# Patient Record
Sex: FEMALE | Race: WHITE | NOT HISPANIC OR LATINO | Employment: FULL TIME | URBAN - METROPOLITAN AREA
[De-identification: names, ages, dates, MRNs, and addresses within clinical notes are randomized per-mention and may not be internally consistent; named-entity substitution may affect disease eponyms.]

---

## 2018-10-20 ENCOUNTER — HOSPITAL ENCOUNTER (EMERGENCY)
Facility: MEDICAL CENTER | Age: 28
End: 2018-10-20
Attending: EMERGENCY MEDICINE
Payer: COMMERCIAL

## 2018-10-20 VITALS
WEIGHT: 175.04 LBS | HEIGHT: 72 IN | HEART RATE: 88 BPM | TEMPERATURE: 97.9 F | DIASTOLIC BLOOD PRESSURE: 90 MMHG | OXYGEN SATURATION: 99 % | BODY MASS INDEX: 23.71 KG/M2 | RESPIRATION RATE: 16 BRPM | SYSTOLIC BLOOD PRESSURE: 140 MMHG

## 2018-10-20 DIAGNOSIS — A09 ACUTE INFECTIOUS DIARRHEA: ICD-10-CM

## 2018-10-20 LAB
ALBUMIN SERPL BCP-MCNC: 3.8 G/DL (ref 3.2–4.9)
ALBUMIN/GLOB SERPL: 1.1 G/DL
ALP SERPL-CCNC: 69 U/L (ref 30–99)
ALT SERPL-CCNC: 17 U/L (ref 2–50)
ANION GAP SERPL CALC-SCNC: 7 MMOL/L (ref 0–11.9)
AST SERPL-CCNC: 16 U/L (ref 12–45)
BASOPHILS # BLD AUTO: 0.3 % (ref 0–1.8)
BASOPHILS # BLD: 0.03 K/UL (ref 0–0.12)
BILIRUB SERPL-MCNC: 0.5 MG/DL (ref 0.1–1.5)
BUN SERPL-MCNC: 12 MG/DL (ref 8–22)
C DIFF DNA SPEC QL NAA+PROBE: NORMAL
C DIFF TOX A+B STL QL IA: POSITIVE
C DIFF TOX GENS STL QL NAA+PROBE: NORMAL
CALCIUM SERPL-MCNC: 9 MG/DL (ref 8.4–10.2)
CHLORIDE SERPL-SCNC: 107 MMOL/L (ref 96–112)
CO2 SERPL-SCNC: 24 MMOL/L (ref 20–33)
CREAT SERPL-MCNC: 0.77 MG/DL (ref 0.5–1.4)
EOSINOPHIL # BLD AUTO: 0.24 K/UL (ref 0–0.51)
EOSINOPHIL NFR BLD: 2.7 % (ref 0–6.9)
ERYTHROCYTE [DISTWIDTH] IN BLOOD BY AUTOMATED COUNT: 48 FL (ref 35.9–50)
GLOBULIN SER CALC-MCNC: 3.5 G/DL (ref 1.9–3.5)
GLUCOSE SERPL-MCNC: 90 MG/DL (ref 65–99)
HCG SERPL QL: NEGATIVE
HCT VFR BLD AUTO: 38.3 % (ref 37–47)
HGB BLD-MCNC: 12.1 G/DL (ref 12–16)
IMM GRANULOCYTES # BLD AUTO: 0.03 K/UL (ref 0–0.11)
IMM GRANULOCYTES NFR BLD AUTO: 0.3 % (ref 0–0.9)
LIPASE SERPL-CCNC: 33 U/L (ref 7–58)
LYMPHOCYTES # BLD AUTO: 1.64 K/UL (ref 1–4.8)
LYMPHOCYTES NFR BLD: 18.3 % (ref 22–41)
MCH RBC QN AUTO: 26.9 PG (ref 27–33)
MCHC RBC AUTO-ENTMCNC: 31.6 G/DL (ref 33.6–35)
MCV RBC AUTO: 85.3 FL (ref 81.4–97.8)
MONOCYTES # BLD AUTO: 0.53 K/UL (ref 0–0.85)
MONOCYTES NFR BLD AUTO: 5.9 % (ref 0–13.4)
NEUTROPHILS # BLD AUTO: 6.47 K/UL (ref 2–7.15)
NEUTROPHILS NFR BLD: 72.5 % (ref 44–72)
NRBC # BLD AUTO: 0 K/UL
NRBC BLD-RTO: 0 /100 WBC
PLATELET # BLD AUTO: 334 K/UL (ref 164–446)
PMV BLD AUTO: 9.5 FL (ref 9–12.9)
POTASSIUM SERPL-SCNC: 3.4 MMOL/L (ref 3.6–5.5)
PROT SERPL-MCNC: 7.3 G/DL (ref 6–8.2)
RBC # BLD AUTO: 4.49 M/UL (ref 4.2–5.4)
SODIUM SERPL-SCNC: 138 MMOL/L (ref 135–145)
WBC # BLD AUTO: 8.9 K/UL (ref 4.8–10.8)
WBC STL QL MICRO: ABNORMAL

## 2018-10-20 PROCEDURE — 36415 COLL VENOUS BLD VENIPUNCTURE: CPT

## 2018-10-20 PROCEDURE — 99284 EMERGENCY DEPT VISIT MOD MDM: CPT

## 2018-10-20 PROCEDURE — 85025 COMPLETE CBC W/AUTO DIFF WBC: CPT

## 2018-10-20 PROCEDURE — 87493 C DIFF AMPLIFIED PROBE: CPT

## 2018-10-20 PROCEDURE — 87324 CLOSTRIDIUM AG IA: CPT

## 2018-10-20 PROCEDURE — 89055 LEUKOCYTE ASSESSMENT FECAL: CPT

## 2018-10-20 PROCEDURE — 700102 HCHG RX REV CODE 250 W/ 637 OVERRIDE(OP): Performed by: EMERGENCY MEDICINE

## 2018-10-20 PROCEDURE — A9270 NON-COVERED ITEM OR SERVICE: HCPCS | Performed by: EMERGENCY MEDICINE

## 2018-10-20 PROCEDURE — 83690 ASSAY OF LIPASE: CPT

## 2018-10-20 PROCEDURE — 84703 CHORIONIC GONADOTROPIN ASSAY: CPT

## 2018-10-20 PROCEDURE — 80053 COMPREHEN METABOLIC PANEL: CPT

## 2018-10-20 PROCEDURE — 700105 HCHG RX REV CODE 258: Performed by: EMERGENCY MEDICINE

## 2018-10-20 RX ORDER — CETIRIZINE HYDROCHLORIDE 10 MG/1
10 TABLET ORAL DAILY
COMMUNITY

## 2018-10-20 RX ORDER — OMEPRAZOLE 20 MG/1
20 CAPSULE, DELAYED RELEASE ORAL DAILY
COMMUNITY

## 2018-10-20 RX ORDER — GINSENG 100 MG
1 CAPSULE ORAL 2 TIMES DAILY
Qty: 60 CAP | Refills: 1 | Status: SHIPPED | OUTPATIENT
Start: 2018-10-20 | End: 2018-11-19

## 2018-10-20 RX ORDER — CLINDAMYCIN HYDROCHLORIDE 150 MG/1
150 CAPSULE ORAL 3 TIMES DAILY
Status: SHIPPED | COMMUNITY
Start: 2018-09-25 | End: 2018-11-09

## 2018-10-20 RX ORDER — SODIUM CHLORIDE 9 MG/ML
1000 INJECTION, SOLUTION INTRAVENOUS CONTINUOUS
Status: ACTIVE | OUTPATIENT
Start: 2018-10-20 | End: 2018-10-20

## 2018-10-20 RX ORDER — NITROFURANTOIN 25; 75 MG/1; MG/1
100 CAPSULE ORAL 2 TIMES DAILY
Status: SHIPPED | COMMUNITY
Start: 2018-10-04 | End: 2018-11-09

## 2018-10-20 RX ORDER — METRONIDAZOLE 500 MG/1
500 TABLET ORAL ONCE
Status: COMPLETED | OUTPATIENT
Start: 2018-10-20 | End: 2018-10-20

## 2018-10-20 RX ORDER — METRONIDAZOLE 500 MG/1
500 TABLET ORAL 3 TIMES DAILY
Qty: 30 TAB | Refills: 0 | Status: SHIPPED | OUTPATIENT
Start: 2018-10-20 | End: 2018-10-30

## 2018-10-20 RX ADMIN — SODIUM CHLORIDE 1000 ML: 9 INJECTION, SOLUTION INTRAVENOUS at 10:13

## 2018-10-20 RX ADMIN — METRONIDAZOLE 500 MG: 500 TABLET ORAL at 11:02

## 2018-10-20 ASSESSMENT — PAIN SCALES - GENERAL: PAINLEVEL_OUTOF10: 4

## 2018-10-20 NOTE — ED PROVIDER NOTES
ED Provider Note    CHIEF COMPLAINT  Chief Complaint   Patient presents with   • Diarrhea     x2 weeks. Foul smelling, ,ultiple episodes daily.  2 recent courses of ABX. 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     • Abdominal Cramping       HPI  Allison Cristobal is a 28 y.o. female who presents complaining of 2 weeks of's foul-smelling diarrhea with some mucus that she has up to 5 times a day.  The patient states that the end of September she had breast augmentation surgery and was on antibiotics for a week.  After this she developed a urinary tract infection and was on antibiotics for that.  She states that she was on clindamycin and Bactrim as her antibiotics.  She states that 2 weeks ago she started having mucousy diarrhea.  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  She is concerned that she might have C. difficile.  The patient denies any vomiting.  She has abdominal cramping.  She denies any dysuria frequency or urgency.  She has no fevers or chills.  She is otherwise healthy without medical problems.  She denies any smoking drinking or drug use.    REVIEW OF SYSTEMS    HEENT:  No ear pain, congestion or sore throat   EYES: no discharge redness or vision changes  CARDIAC: no chest pain, palpitations    PULMONARY: no dyspnea, cough or congestion   GI: no vomiting positive diarrhea positive abdominal pain   : no dysuria, back pain or hematuria   Neuro: no weakness, numbness aphasia or headache  Musculoskeletal: no swelling deformity or pain no joint swelling  Endocrine: no fevers, sweating, weight loss   SKIN: no rash, erythema or contusions     See history of present illness all other systems are negative      PAST MEDICAL HISTORY  No past medical history on file.    FAMILY HISTORY  No family history on file.    SOCIAL HISTORY  Social History     Social History   • Marital status: Single     Spouse name: N/A   • Number of children: N/A   • Years of education: N/A     Social History  "Main Topics   • Smoking status: Not on file   • Smokeless tobacco: Not on file   • Alcohol use Not on file   • Drug use: Unknown   • Sexual activity: Not on file     Other Topics Concern   • Not on file     Social History Narrative   • No narrative on file       SURGICAL HISTORY  Past Surgical History:   Procedure Laterality Date   • OTHER      Breast augmentation       CURRENT MEDICATIONS  Home Medications     Reviewed by Uriel Vital (Pharmacy Tech) on 10/20/18 at 1051  Med List Status: Complete   Medication Last Dose Status   cetirizine (ZYRTEC) 10 MG Tab 10/20/2018 Active   clindamycin (CLEOCIN) 150 MG Cap 9/30/2018 Active   nitrofurantoin monohydr macro (MACROBID) 100 MG Cap 10/10/2018 Active   norethindrone-ethinyl estradiol (CYCLAFEM 1/35) 1-35 MG-MCG per tablet 10/20/2018 Active   omeprazole (PRILOSEC) 20 MG delayed-release capsule 10/19/2018 Active                 ALLERGIES  Allergies   Allergen Reactions   • Cefaclor Rash and Swelling     Rxn - ongoing     • Nsaids Swelling     Rxn - ongoing   • Pcn [Penicillins] Rash and Swelling     Rxn - ongoing     • Sulfamethoxazole W-Trimethoprim Rash and Swelling     Rxn - ongoing         PHYSICAL EXAM  VITAL SIGNS: /103   Pulse (!) 110   Temp 36.6 °C (97.9 °F)   Resp 16   Ht 2.007 m (6' 7\")   Wt 79.4 kg (175 lb 0.7 oz)   LMP 10/08/2018 (Approximate)   SpO2 98%   BMI 19.72 kg/m²  Room air O2: 98    Constitutional: Well developed, Well nourished, No acute distress, Non-toxic appearance.   HENT: Normocephalic, Atraumatic, Bilateral external ears normal, Oropharynx moist, No oral exudates, Nose normal.   Eyes: PERRLA, EOMI, Conjunctiva normal, No discharge.   Neck: Normal range of motion, No tenderness, Supple, No stridor.   Lymphatic: No lymphadenopathy noted.   Cardiovascular: Normal heart rate, Normal rhythm, No murmurs, No rubs, No gallops.   Thorax & Lungs: Normal breath sounds, No respiratory distress, No wheezing, No chest tenderness. "   Abdomen: Increased bowel sounds no distention no rebound masses or peritoneal signs abdomen is soft to palpation  Skin: Warm, Dry, No erythema, No rash.   Back: No tenderness, No CVA tenderness.   Extremities: Intact distal pulses, No edema, No tenderness, No cyanosis, No clubbing.   Neurologic: Alert & oriented x 3, Normal motor function, Normal sensory function, No focal deficits noted.           COURSE & MEDICAL DECISION MAKING  Pertinent Labs & Imaging studies reviewed. (See chart for details)  Differential diagnosis: C. difficile, gastroenteritis, colitis, dehydration    Results for orders placed or performed during the hospital encounter of 10/20/18   STOOL WBC'S   Result Value Ref Range    Stool WBC's Many (A) None seen   CBC WITH DIFFERENTIAL   Result Value Ref Range    WBC 8.9 4.8 - 10.8 K/uL    RBC 4.49 4.20 - 5.40 M/uL    Hemoglobin 12.1 12.0 - 16.0 g/dL    Hematocrit 38.3 37.0 - 47.0 %    MCV 85.3 81.4 - 97.8 fL    MCH 26.9 (L) 27.0 - 33.0 pg    MCHC 31.6 (L) 33.6 - 35.0 g/dL    RDW 48.0 35.9 - 50.0 fL    Platelet Count 334 164 - 446 K/uL    MPV 9.5 9.0 - 12.9 fL    Neutrophils-Polys 72.50 (H) 44.00 - 72.00 %    Lymphocytes 18.30 (L) 22.00 - 41.00 %    Monocytes 5.90 0.00 - 13.40 %    Eosinophils 2.70 0.00 - 6.90 %    Basophils 0.30 0.00 - 1.80 %    Immature Granulocytes 0.30 0.00 - 0.90 %    Nucleated RBC 0.00 /100 WBC    Neutrophils (Absolute) 6.47 2.00 - 7.15 K/uL    Lymphs (Absolute) 1.64 1.00 - 4.80 K/uL    Monos (Absolute) 0.53 0.00 - 0.85 K/uL    Eos (Absolute) 0.24 0.00 - 0.51 K/uL    Baso (Absolute) 0.03 0.00 - 0.12 K/uL    Immature Granulocytes (abs) 0.03 0.00 - 0.11 K/uL    NRBC (Absolute) 0.00 K/uL   COMP METABOLIC PANEL   Result Value Ref Range    Sodium 138 135 - 145 mmol/L    Potassium 3.4 (L) 3.6 - 5.5 mmol/L    Chloride 107 96 - 112 mmol/L    Co2 24 20 - 33 mmol/L    Anion Gap 7.0 0.0 - 11.9    Glucose 90 65 - 99 mg/dL    Bun 12 8 - 22 mg/dL    Creatinine 0.77 0.50 - 1.40 mg/dL     Calcium 9.0 8.4 - 10.2 mg/dL    AST(SGOT) 16 12 - 45 U/L    ALT(SGPT) 17 2 - 50 U/L    Alkaline Phosphatase 69 30 - 99 U/L    Total Bilirubin 0.5 0.1 - 1.5 mg/dL    Albumin 3.8 3.2 - 4.9 g/dL    Total Protein 7.3 6.0 - 8.2 g/dL    Globulin 3.5 1.9 - 3.5 g/dL    A-G Ratio 1.1 g/dL   LIPASE   Result Value Ref Range    Lipase 33 7 - 58 U/L   BETA-HCG QUALITATIVE SERUM   Result Value Ref Range    Beta-Hcg Qualitative Serum Negative Negative   ESTIMATED GFR   Result Value Ref Range    GFR If African American >60 >60 mL/min/1.73 m 2    GFR If Non African American >60 >60 mL/min/1.73 m 2        10:30 AM an IV was started and I gave the patient a liter of fluid because of her constant diarrhea and feeling dehydrated.HYDRATION: Based on the patient's presentation of Acute Diarrhea the patient was given IV fluids. IV Hydration was used becasue oral hydration was not adequate alone. Upon recheck following hydration, the patient was improved.     10:59 AM patient feels improved after IV fluids.  She does have a large amount of white cells in her stool.  Given her history I will treat her with Flagyl for possible C. difficile.  She is able to keep fluids down and will be discharged home with oral medication.  If she develops any abdominal distention fevers dizziness or worsening symptoms she should return.  I advised her to follow-up with a primary care physician for recheck.  The patient understands her diagnosis and treatment plan.    [address] [address]  [phone]  Call in 2 days  to establish care, for recheck    Current Outpatient Prescriptions   Medication Sig Dispense Refill   • omeprazole (PRILOSEC) 20 MG delayed-release capsule Take 20 mg by mouth every day.     • cetirizine (ZYRTEC) 10 MG Tab Take 10 mg by mouth every day.     • norethindrone-ethinyl estradiol (CYCLAFEM 1/35) 1-35 MG-MCG per tablet Take 1 Tab by mouth every day.     • nitrofurantoin monohydr macro (MACROBID)  100 MG Cap Take 100 mg by mouth 2 times a day. 6 day course started 10/4/18 - completed     • clindamycin (CLEOCIN) 150 MG Cap Take 150 mg by mouth 3 times a day. 5 day course started 9/25/18     • metroNIDAZOLE (FLAGYL) 500 MG Tab Take 1 Tab by mouth 3 times a day for 10 days. 30 Tab 0   • Probiotic Product (ACIDOPHILUS HIGH-POTENCY) Cap Take 1 Cap by mouth 2 Times a Day for 30 days. 60 Cap 1         FINAL IMPRESSION  1. Acute infectious diarrhea            Electronically signed by: Deloris Schilling, 10/20/2018 10:28 AM

## 2018-10-20 NOTE — ED NOTES
The Medication Reconciliation process has been completed by interviewing the patient    Allergies have been reviewed  Antibiotic use in 30 days - clindamycin and macrobid    Home Pharmacy:  [facility] [place]

## 2018-10-20 NOTE — LETTER
10/30/2018               Allison [address] [place] [address]        Dear Allison (MR#[id number])    This letter is sent in regards to your, recent visit to the [facility] Emergency Department on 10/20/2018.  During the visit, tests were performed to assist the physician in a medical diagnosis.  A review of those tests requires that we notify you of the following:    Your stool culture was POSITIVE for a bacteria called Clostridium difficile. The antibiotic prescribed for you (metronidazole) should be active to treat this bacteria. IT IS IMPORTANT THAT YOU CONTINUE TAKING YOUR ANTIBIOTIC UNTIL IT IS FINISHED.      Please feel free to contact me at the number below if you have any questions or concerns. Thank you for your cooperation in the matter.    Sincerely,  ED Culture Follow-Up Staff  Lydia Kebede, PharmD    [facility], Emergency Department  [address] [place] [id number]  [phone] (ED Culture Line)  [phone]

## 2018-10-20 NOTE — ED TRIAGE NOTES
Allison Cristobal 28 y.o. female     Chief Complaint   Patient presents with   • Diarrhea     x2 weeks. Foul smelling, ,ultiple episodes daily.  2 recent courses of ABX. 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     • Abdominal Cramping        Pt returned to lobby and educated on triage process.  Advised to notify RN with changes or concerns.

## 2018-10-31 NOTE — ED NOTES
"ED Positive Culture Follow-up/Notification Note:    Date: 10/30/18     Patient seen in the ED on 10/20/2018 for 2 weeks of foul-smelling diarrhea up to 5 times per day. Recently on antibiotics (Clindamycin and nitrofurantoin) after surgery and UTI.  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  1. Acute infectious diarrhea       Discharge Medication List as of 10/20/2018 11:05 AM      START taking these medications    Details   metroNIDAZOLE (FLAGYL) 500 MG Tab Take 1 Tab by mouth 3 times a day for 10 days., Disp-30 Tab, R-0, Normal      Probiotic Product (ACIDOPHILUS HIGH-POTENCY) Cap Take 1 Cap by mouth 2 Times a Day for 30 days., Disp-60 Cap, R-1, Normal             Allergies: Cefaclor; Nsaids; Pcn [penicillins]; and Sulfamethoxazole w-trimethoprim     Vitals:    10/20/18 0907 10/20/18 0910 10/20/18 1055   BP:  159/103 140/90   Pulse:  (!) 110 88   Resp:  16    Temp:  36.6 °C (97.9 °F)    SpO2:  98% 99%   Weight: 79.4 kg (175 lb 0.7 oz)     Height: 2.007 m (6' 7\")         Final cultures:      Ref. Range 10/20/2018 09:50   [id number] Presumptive Latest Ref Range: Negative  See Toxin   C Diff by PCR Latest Ref Range: Negative  See Toxin   C.Diff Toxin A&B Unknown Positive (A)       Plan:   Appropriate antibiotic therapy prescribed. No changes required based upon culture result.  Sent letter to patient to notify of positive culture result and encourage compliance with prescribed antibiotics.     Lydia Kebede    "

## 2018-11-09 RX ORDER — VANCOMYCIN HYDROCHLORIDE 25 MG/ML
125 KIT ORAL EVERY 6 HOURS
Qty: 200 ML | Refills: 0 | Status: SHIPPED | OUTPATIENT
Start: 2018-11-09 | End: 2018-11-19

## 2018-11-09 NOTE — ED NOTES
Patient returned call and states that she is still having frequent loose stools despite taking the metronidazole. Will call in vancomycin 25 mg/mL suspension, take 2.5 mL po q6 hours x 10 days, #100 mL, no refills to [facility].   Encouraged to get further evaluated if ongoing diarrhea despite vancomycin.    Lydia Kebede, PharmD

## 2022-12-23 ENCOUNTER — EH NON-PROVIDER (OUTPATIENT)
Dept: OCCUPATIONAL MEDICINE | Facility: CLINIC | Age: 32
End: 2022-12-23

## 2022-12-23 ENCOUNTER — HOSPITAL ENCOUNTER (OUTPATIENT)
Facility: MEDICAL CENTER | Age: 32
End: 2022-12-23
Attending: PREVENTIVE MEDICINE
Payer: COMMERCIAL

## 2022-12-23 DIAGNOSIS — Z02.89 ENCOUNTER FOR OCCUPATIONAL HEALTH ASSESSMENT: ICD-10-CM

## 2022-12-23 DIAGNOSIS — Z02.1 PRE-EMPLOYMENT DRUG SCREENING: ICD-10-CM

## 2022-12-23 LAB
AMP AMPHETAMINE: NORMAL
BAR BARBITURATES: NORMAL
BZO BENZODIAZEPINES: NORMAL
COC COCAINE: NORMAL
INT CON NEG: NORMAL
INT CON POS: NORMAL
MDMA ECSTASY: NORMAL
MET METHAMPHETAMINES: NORMAL
MTD METHADONE: NORMAL
OPI OPIATES: NORMAL
OXY OXYCODONE: NORMAL
PCP PHENCYCLIDINE: NORMAL
POC URINE DRUG SCREEN OCDRS: NEGATIVE
THC: NORMAL

## 2022-12-23 PROCEDURE — 86480 TB TEST CELL IMMUN MEASURE: CPT | Performed by: PREVENTIVE MEDICINE

## 2022-12-23 PROCEDURE — 94375 RESPIRATORY FLOW VOLUME LOOP: CPT | Performed by: PREVENTIVE MEDICINE

## 2022-12-23 PROCEDURE — 94010 BREATHING CAPACITY TEST: CPT | Performed by: PREVENTIVE MEDICINE

## 2022-12-23 PROCEDURE — 86787 VARICELLA-ZOSTER ANTIBODY: CPT | Performed by: PREVENTIVE MEDICINE

## 2022-12-23 PROCEDURE — 80305 DRUG TEST PRSMV DIR OPT OBS: CPT | Performed by: PREVENTIVE MEDICINE

## 2022-12-26 LAB — VZV IGG SER IA-ACNC: 200.6 IV

## 2022-12-27 LAB
GAMMA INTERFERON BACKGROUND BLD IA-ACNC: 0.52 IU/ML
M TB IFN-G BLD-IMP: NEGATIVE
M TB IFN-G CD4+ BCKGRND COR BLD-ACNC: -0.36 IU/ML
MITOGEN IGNF BCKGRD COR BLD-ACNC: 5.94 IU/ML
QFT TB2 - NIL TBQ2: -0.38 IU/ML

## 2022-12-30 ENCOUNTER — EMPLOYEE HEALTH (OUTPATIENT)
Dept: OCCUPATIONAL MEDICINE | Facility: CLINIC | Age: 32
End: 2022-12-30

## 2022-12-30 DIAGNOSIS — Z02.1 PRE-EMPLOYMENT HEALTH SCREENING EXAMINATION: ICD-10-CM

## 2022-12-30 PROCEDURE — 8915 PR COMPREHENSIVE PHYSICAL: Performed by: NURSE PRACTITIONER

## 2023-11-14 ENCOUNTER — EMPLOYEE HEALTH (OUTPATIENT)
Dept: OCCUPATIONAL MEDICINE | Facility: CLINIC | Age: 33
End: 2023-11-14

## 2023-11-14 ENCOUNTER — EH NON-PROVIDER (OUTPATIENT)
Dept: OCCUPATIONAL MEDICINE | Facility: CLINIC | Age: 33
End: 2023-11-14

## 2023-11-14 VITALS
TEMPERATURE: 97.8 F | RESPIRATION RATE: 20 BRPM | OXYGEN SATURATION: 99 % | DIASTOLIC BLOOD PRESSURE: 74 MMHG | WEIGHT: 199.8 LBS | BODY MASS INDEX: 28.6 KG/M2 | HEIGHT: 70 IN | SYSTOLIC BLOOD PRESSURE: 108 MMHG | HEART RATE: 84 BPM

## 2023-11-14 DIAGNOSIS — Z02.1 PRE-EMPLOYMENT HEALTH SCREENING EXAMINATION: ICD-10-CM

## 2023-11-14 DIAGNOSIS — Z02.1 PRE-EMPLOYMENT DRUG SCREENING: ICD-10-CM

## 2023-11-14 LAB
AMP AMPHETAMINE: NEGATIVE
BAR BARBITURATES: NEGATIVE
BZO BENZODIAZEPINES: NEGATIVE
COC COCAINE: NEGATIVE
INT CON NEG: NORMAL
INT CON POS: NORMAL
MDMA ECSTASY: NEGATIVE
MET METHAMPHETAMINES: NEGATIVE
MTD METHADONE: NEGATIVE
OPI OPIATES: NEGATIVE
OXY OXYCODONE: NEGATIVE
PCP PHENCYCLIDINE: NEGATIVE
POC URINE DRUG SCREEN OCDRS: NEGATIVE
THC: NEGATIVE

## 2023-11-14 PROCEDURE — 94375 RESPIRATORY FLOW VOLUME LOOP: CPT | Performed by: NURSE PRACTITIONER

## 2023-11-14 PROCEDURE — 8915 PR COMPREHENSIVE PHYSICAL: Performed by: NURSE PRACTITIONER

## 2023-11-14 PROCEDURE — 80305 DRUG TEST PRSMV DIR OPT OBS: CPT | Performed by: NURSE PRACTITIONER

## 2023-11-14 ASSESSMENT — FIBROSIS 4 INDEX: FIB4 SCORE: 0.42

## 2023-11-14 NOTE — PROGRESS NOTES
Immunizations  1) Administered: None  2) Verified 2 MMR, Varicella titer (Immunity), Current Tdap, 3 Hep B, 1 Covid, Flu  3) Declined None    Labs:  1) Quantiferon (Has a titer 12/2022)

## 2024-02-29 ENCOUNTER — EH NON-PROVIDER (OUTPATIENT)
Dept: OCCUPATIONAL MEDICINE | Facility: CLINIC | Age: 34
End: 2024-02-29

## 2024-02-29 DIAGNOSIS — Z02.89 ENCOUNTER FOR OCCUPATIONAL HEALTH ASSESSMENT: ICD-10-CM

## 2024-02-29 PROCEDURE — 94375 RESPIRATORY FLOW VOLUME LOOP: CPT | Performed by: NURSE PRACTITIONER

## 2024-09-24 ENCOUNTER — IMMUNIZATION (OUTPATIENT)
Dept: OCCUPATIONAL MEDICINE | Facility: CLINIC | Age: 34
End: 2024-09-24

## 2024-09-24 DIAGNOSIS — Z23 NEED FOR VACCINATION: Primary | ICD-10-CM

## 2024-11-15 ENCOUNTER — HOSPITAL ENCOUNTER (EMERGENCY)
Facility: MEDICAL CENTER | Age: 34
End: 2024-11-15
Attending: EMERGENCY MEDICINE
Payer: COMMERCIAL

## 2024-11-15 VITALS
TEMPERATURE: 97.3 F | OXYGEN SATURATION: 97 % | HEIGHT: 70 IN | HEART RATE: 91 BPM | DIASTOLIC BLOOD PRESSURE: 66 MMHG | WEIGHT: 160.05 LBS | SYSTOLIC BLOOD PRESSURE: 118 MMHG | BODY MASS INDEX: 22.91 KG/M2 | RESPIRATION RATE: 19 BRPM

## 2024-11-15 DIAGNOSIS — R11.2 NAUSEA AND VOMITING, UNSPECIFIED VOMITING TYPE: ICD-10-CM

## 2024-11-15 DIAGNOSIS — T78.40XA ALLERGIC REACTION, INITIAL ENCOUNTER: ICD-10-CM

## 2024-11-15 DIAGNOSIS — L50.9 HIVES: ICD-10-CM

## 2024-11-15 LAB
ALBUMIN SERPL BCP-MCNC: 4.3 G/DL (ref 3.2–4.9)
ALBUMIN/GLOB SERPL: 1.2 G/DL
ALP SERPL-CCNC: 51 U/L (ref 30–99)
ALT SERPL-CCNC: 15 U/L (ref 2–50)
ANION GAP SERPL CALC-SCNC: 15 MMOL/L (ref 7–16)
AST SERPL-CCNC: 18 U/L (ref 12–45)
BASOPHILS # BLD AUTO: 0.1 % (ref 0–1.8)
BASOPHILS # BLD: 0.01 K/UL (ref 0–0.12)
BILIRUB SERPL-MCNC: 0.8 MG/DL (ref 0.1–1.5)
BUN SERPL-MCNC: 13 MG/DL (ref 8–22)
CALCIUM ALBUM COR SERPL-MCNC: 8.9 MG/DL (ref 8.5–10.5)
CALCIUM SERPL-MCNC: 9.1 MG/DL (ref 8.4–10.2)
CHLORIDE SERPL-SCNC: 103 MMOL/L (ref 96–112)
CO2 SERPL-SCNC: 18 MMOL/L (ref 20–33)
CREAT SERPL-MCNC: 0.76 MG/DL (ref 0.5–1.4)
EOSINOPHIL # BLD AUTO: 0 K/UL (ref 0–0.51)
EOSINOPHIL NFR BLD: 0 % (ref 0–6.9)
ERYTHROCYTE [DISTWIDTH] IN BLOOD BY AUTOMATED COUNT: 46 FL (ref 35.9–50)
GFR SERPLBLD CREATININE-BSD FMLA CKD-EPI: 106 ML/MIN/1.73 M 2
GLOBULIN SER CALC-MCNC: 3.5 G/DL (ref 1.9–3.5)
GLUCOSE SERPL-MCNC: 92 MG/DL (ref 65–99)
HCT VFR BLD AUTO: 48.6 % (ref 37–47)
HGB BLD-MCNC: 16.7 G/DL (ref 12–16)
IMM GRANULOCYTES # BLD AUTO: 0.02 K/UL (ref 0–0.11)
IMM GRANULOCYTES NFR BLD AUTO: 0.2 % (ref 0–0.9)
LIPASE SERPL-CCNC: 80 U/L (ref 11–82)
LYMPHOCYTES # BLD AUTO: 0.85 K/UL (ref 1–4.8)
LYMPHOCYTES NFR BLD: 7 % (ref 22–41)
MCH RBC QN AUTO: 31.7 PG (ref 27–33)
MCHC RBC AUTO-ENTMCNC: 34.4 G/DL (ref 32.2–35.5)
MCV RBC AUTO: 92.2 FL (ref 81.4–97.8)
MONOCYTES # BLD AUTO: 0.35 K/UL (ref 0–0.85)
MONOCYTES NFR BLD AUTO: 2.9 % (ref 0–13.4)
NEUTROPHILS # BLD AUTO: 10.92 K/UL (ref 1.82–7.42)
NEUTROPHILS NFR BLD: 89.8 % (ref 44–72)
NRBC # BLD AUTO: 0 K/UL
NRBC BLD-RTO: 0 /100 WBC (ref 0–0.2)
PLATELET # BLD AUTO: 290 K/UL (ref 164–446)
PMV BLD AUTO: 10.3 FL (ref 9–12.9)
POTASSIUM SERPL-SCNC: 3.7 MMOL/L (ref 3.6–5.5)
PROT SERPL-MCNC: 7.8 G/DL (ref 6–8.2)
RBC # BLD AUTO: 5.27 M/UL (ref 4.2–5.4)
SODIUM SERPL-SCNC: 136 MMOL/L (ref 135–145)
WBC # BLD AUTO: 12.2 K/UL (ref 4.8–10.8)

## 2024-11-15 PROCEDURE — 96375 TX/PRO/DX INJ NEW DRUG ADDON: CPT

## 2024-11-15 PROCEDURE — 700111 HCHG RX REV CODE 636 W/ 250 OVERRIDE (IP): Mod: JZ | Performed by: EMERGENCY MEDICINE

## 2024-11-15 PROCEDURE — 96374 THER/PROPH/DIAG INJ IV PUSH: CPT

## 2024-11-15 PROCEDURE — 80053 COMPREHEN METABOLIC PANEL: CPT

## 2024-11-15 PROCEDURE — 36415 COLL VENOUS BLD VENIPUNCTURE: CPT

## 2024-11-15 PROCEDURE — 85025 COMPLETE CBC W/AUTO DIFF WBC: CPT

## 2024-11-15 PROCEDURE — 83690 ASSAY OF LIPASE: CPT

## 2024-11-15 PROCEDURE — 99284 EMERGENCY DEPT VISIT MOD MDM: CPT

## 2024-11-15 RX ORDER — PREDNISONE 20 MG/1
40 TABLET ORAL DAILY
Qty: 8 TABLET | Refills: 0 | Status: SHIPPED | OUTPATIENT
Start: 2024-11-15 | End: 2024-11-19

## 2024-11-15 RX ORDER — DIPHENHYDRAMINE HCL 25 MG
25-50 CAPSULE ORAL EVERY 6 HOURS PRN
Qty: 30 CAPSULE | Refills: 0 | Status: SHIPPED | OUTPATIENT
Start: 2024-11-15

## 2024-11-15 RX ORDER — ONDANSETRON 2 MG/ML
4 INJECTION INTRAMUSCULAR; INTRAVENOUS ONCE
Status: COMPLETED | OUTPATIENT
Start: 2024-11-15 | End: 2024-11-15

## 2024-11-15 RX ORDER — DIPHENHYDRAMINE HYDROCHLORIDE 50 MG/ML
25 INJECTION INTRAMUSCULAR; INTRAVENOUS ONCE
Status: COMPLETED | OUTPATIENT
Start: 2024-11-15 | End: 2024-11-15

## 2024-11-15 RX ORDER — DIPHENHYDRAMINE HCL 25 MG
25-50 CAPSULE ORAL EVERY 6 HOURS PRN
Qty: 30 CAPSULE | Refills: 0 | Status: SHIPPED | OUTPATIENT
Start: 2024-11-15 | End: 2024-11-15

## 2024-11-15 RX ORDER — METHYLPREDNISOLONE SODIUM SUCCINATE 125 MG/2ML
125 INJECTION, POWDER, LYOPHILIZED, FOR SOLUTION INTRAMUSCULAR; INTRAVENOUS ONCE
Status: COMPLETED | OUTPATIENT
Start: 2024-11-15 | End: 2024-11-15

## 2024-11-15 RX ORDER — FAMOTIDINE 20 MG/1
20 TABLET, FILM COATED ORAL 2 TIMES DAILY
Qty: 10 TABLET | Refills: 0 | Status: SHIPPED | OUTPATIENT
Start: 2024-11-15 | End: 2024-11-20

## 2024-11-15 RX ORDER — PREDNISONE 20 MG/1
40 TABLET ORAL DAILY
Qty: 8 TABLET | Refills: 0 | Status: SHIPPED | OUTPATIENT
Start: 2024-11-15 | End: 2024-11-15

## 2024-11-15 RX ORDER — FAMOTIDINE 20 MG/1
20 TABLET, FILM COATED ORAL 2 TIMES DAILY
Qty: 10 TABLET | Refills: 0 | Status: SHIPPED | OUTPATIENT
Start: 2024-11-15 | End: 2024-11-15

## 2024-11-15 RX ADMIN — METHYLPREDNISOLONE SODIUM SUCCINATE 125 MG: 125 INJECTION, POWDER, FOR SOLUTION INTRAMUSCULAR; INTRAVENOUS at 10:25

## 2024-11-15 RX ADMIN — ONDANSETRON 4 MG: 2 INJECTION INTRAMUSCULAR; INTRAVENOUS at 10:14

## 2024-11-15 RX ADMIN — DIPHENHYDRAMINE HYDROCHLORIDE 25 MG: 50 INJECTION, SOLUTION INTRAMUSCULAR; INTRAVENOUS at 10:18

## 2024-11-15 RX ADMIN — FAMOTIDINE 20 MG: 10 INJECTION INTRAVENOUS at 10:26

## 2024-11-15 NOTE — ED PROVIDER NOTES
ED Provider Note    CHIEF COMPLAINT  Chief Complaint   Patient presents with    Abdominal Pain     Ruq last night    Vomiting     Last night    Rash     This am       EXTERNAL RECORDS REVIEWED  Patient's last encounter was a follow-up visit with pharmacology in 2018 for an acute infectious diarrhea.  Patient was diagnosed with C. difficile.    HPI/ROS  LIMITATION TO HISTORY   Select: : None  OUTSIDE HISTORIAN(S):  Significant other BF x 7 years, reports q 1hr n/v since midnight    Allison Yanes is a 34 y.o. female who presents to the emergency department with a chief complaint of an allergic reaction.  Other than Zepbound which she last had an injection of on Wednesday night, because she cannot think of any triggers.  She has been on Zepbound for the last 4 months and has not had a reaction.  She states over the course of her life, she has had this type of reaction multiple times.  She is allergic to NSAIDs and various antibiotics as well as various foods.  She states it has been sometime since she has had a reaction such as this.  She moved to [place] several years ago and states that her normal seasonal allergies have been better.  She started feeling nausea and vomiting at 1140 last night.  She went to sleep, woke up itching at 2:00 in the morning, she had some hives on her left lower abdominal wall, this progressed to diffuse hives including her extremities.  Her partner reports that over the last several hours she has vomited about every hour.  She most recently vomited about 5 AM.  She did try Benadryl and Zofran at home but she probably vomited them up.  She reports feeling hot and cold but denies a fever.  Other medications include omeprazole and Zyrtec.  She takes Zyrtec on a regular basis for chronic environmental allergies.    PAST MEDICAL HISTORY   GERD    SURGICAL HISTORY   has a past surgical history that includes other.    FAMILY HISTORY  History reviewed. No pertinent family  "history.    SOCIAL HISTORY  Social History     Tobacco Use    Smoking status: Never    Smokeless tobacco: Never   Vaping Use    Vaping status: Never Used   Substance and Sexual Activity    Alcohol use: Yes     Comment: socially    Drug use: Never    Sexual activity: Not on file       CURRENT MEDICATIONS  Home Medications    **Home medications have not yet been reviewed for this encounter**         ALLERGIES  Allergies   Allergen Reactions    Cefaclor Rash and Swelling     Rxn - ongoing      Nsaids Rash and Swelling     Rxn - ongoing    Pcn [Penicillins] Rash and Swelling     Rxn - ongoing      Sulfamethoxazole W-Trimethoprim Rash and Swelling     Rxn - ongoing         PHYSICAL EXAM  VITAL SIGNS: /71   Pulse 91   Temp 36.5 °C (97.7 °F) (Temporal)   Resp 20   Ht 1.778 m (5' 10\")   Wt 72.6 kg (160 lb 0.9 oz)   SpO2 97%   BMI 22.97 kg/m²    Vitals reviewed.  Constitutional: Patient is oriented to person, place, and time. Appears well-developed and well-nourished. No distress.    Head: Normocephalic and atraumatic.   Mouth/Throat: Oropharynx is clear and moist, no exudates.   Eyes: Conjunctivae are normal.   Neck: Normal range of motion. Neck supple.  Cardiovascular: Normal rate, regular rhythm and normal heart sounds.   Pulmonary/Chest: Effort normal and breath sounds normal. No respiratory distress, no wheezes, rhonchi, or rales.   Abdominal: Soft. Bowel sounds are normal. There is no tenderness  Musculoskeletal: No edema and no tenderness.   Neurological: Normal gait.  No focal deficits.   Skin: Skin is warm and dry. No erythema, except as noted. No pallor.  There are scattered hives over bilateral lower extremities, upper chest, trunk, upper extremities.  Psychiatric: Patient has a normal mood and affect.     EKG/LABS  Results for orders placed or performed during the hospital encounter of 11/15/24   CBC WITH DIFFERENTIAL    Collection Time: 11/15/24  9:34 AM   Result Value Ref Range    WBC 12.2 (H) 4.8 " - 10.8 K/uL    RBC 5.27 4.20 - 5.40 M/uL    Hemoglobin 16.7 (H) 12.0 - 16.0 g/dL    Hematocrit 48.6 (H) 37.0 - 47.0 %    MCV 92.2 81.4 - 97.8 fL    MCH 31.7 27.0 - 33.0 pg    MCHC 34.4 32.2 - 35.5 g/dL    RDW 46.0 35.9 - 50.0 fL    Platelet Count 290 164 - 446 K/uL    MPV 10.3 9.0 - 12.9 fL    Neutrophils-Polys 89.80 (H) 44.00 - 72.00 %    Lymphocytes 7.00 (L) 22.00 - 41.00 %    Monocytes 2.90 0.00 - 13.40 %    Eosinophils 0.00 0.00 - 6.90 %    Basophils 0.10 0.00 - 1.80 %    Immature Granulocytes 0.20 0.00 - 0.90 %    Nucleated RBC 0.00 0.00 - 0.20 /100 WBC    Neutrophils (Absolute) 10.92 (H) 1.82 - 7.42 K/uL    Lymphs (Absolute) 0.85 (L) 1.00 - 4.80 K/uL    Monos (Absolute) 0.35 0.00 - 0.85 K/uL    Eos (Absolute) 0.00 0.00 - 0.51 K/uL    Baso (Absolute) 0.01 0.00 - 0.12 K/uL    Immature Granulocytes (abs) 0.02 0.00 - 0.11 K/uL    NRBC (Absolute) 0.00 K/uL   LIPASE    Collection Time: 11/15/24  9:34 AM   Result Value Ref Range    Lipase 80 11 - 82 U/L   CMP    Collection Time: 11/15/24  9:34 AM   Result Value Ref Range    Sodium 136 135 - 145 mmol/L    Potassium 3.7 3.6 - 5.5 mmol/L    Chloride 103 96 - 112 mmol/L    Co2 18 (L) 20 - 33 mmol/L    Anion Gap 15.0 7.0 - 16.0    Glucose 92 65 - 99 mg/dL    Bun 13 8 - 22 mg/dL    Creatinine 0.76 0.50 - 1.40 mg/dL    Calcium 9.1 8.4 - 10.2 mg/dL    Correct Calcium 8.9 8.5 - 10.5 mg/dL    AST(SGOT) 18 12 - 45 U/L    ALT(SGPT) 15 2 - 50 U/L    Alkaline Phosphatase 51 30 - 99 U/L    Total Bilirubin 0.8 0.1 - 1.5 mg/dL    Albumin 4.3 3.2 - 4.9 g/dL    Total Protein 7.8 6.0 - 8.2 g/dL    Globulin 3.5 1.9 - 3.5 g/dL    A-G Ratio 1.2 g/dL   ESTIMATED GFR    Collection Time: 11/15/24  9:34 AM   Result Value Ref Range    GFR (CKD-EPI) 106 >60 mL/min/1.73 m 2     RADIOLOGY/PROCEDURES       COURSE & MEDICAL DECISION MAKING    ASSESSMENT, COURSE AND PLAN  Care Narrative:     This is a pleasant 34-year-old female.  She presents with an allergic reaction.  The trigger is unknown.   Possibly is Zepbound although this is her fourth dose and she has not reacted in the past.  She has had similar type reactions in the past to NSAIDs and certain foods and antibiotics but she knows of no specific triggers at this time.  She is not complaining of any difficulty breathing.  Her vital signs are reassuring.  She took Benadryl and Zofran early this morning but then vomited.  An IV will be established.  She will be provided antiemetics and antihistamines will continue to monitor.    12:27 PM patient is reevaluated at the bedside.  She has a mild increase in her white blood cell count which may be related to this reaction.  She remains afebrile.  H&H 16 and 48.  There is a neutrophilic shift.  Chemistries unrevealing.  Her lipase is inside the normal range.  She was concerned about this given that this amount can be associated with pancreatitis.  LFTs were normal.  Her rash is near completely resolved.  She is feeling much better.  Again vital signs are reassuring.  At this point, I feel she can safely be discharged to home.  She will monitor for symptoms at home particularly, when it is time for her next Zepbound an injection.  She is discharged home with a course of Benadryl, Pepcid as well as steroids.  She is in stable condition.    ADDITIONAL PROBLEMS MANAGED    DISPOSITION AND DISCUSSIONS  I have discussed management of the patient with the following physicians and LINDSEY's:  None    Discussion of management with other QHP or appropriate source(s): None     Escalation of care considered, and ultimately not performed: None    Barriers to care at this time, including but not limited to: None.     Decision tools and prescription drugs considered including, but not limited to: None.    FINAL DIAGNOSIS  1. Allergic reaction, initial encounter    2. Nausea and vomiting, unspecified vomiting type    3. Hives         Electronically signed by: Shanti Laureano D.O., 11/15/2024 9:51 AM

## 2024-11-15 NOTE — ED NOTES
Pt states she had right upper abdominal pain that started last night and then started feeling itchy with hives and wide spread rash throughout her body starting around 02:00 this morning. Pt denies constriction of her airway or swelling of the lips or tongue. Pt states she has been taking Zepbound and took her most recent shot on Wednesday and started having these symptoms Thursday night. Pt states she has had allergic reactions to NSAIDS, and antibiotics.

## 2024-11-15 NOTE — ED NOTES
Pt rounding, decrease in rash to face noted, pt sleeping, spouse at bedside, additional warm blanket provided

## 2024-11-15 NOTE — ED NOTES
Dc instructions reviewed with pt and spouse. Aware of need to f/u with pcp,  meds at [facility] on [address]-take benadryl as needed, pepcid 1 dose tonight, start bid tomorrow as well as steroid. Return for worsening s/s

## 2024-11-15 NOTE — ED NOTES
Pt rounding, rash absent at this time. Pharmacy verified and chgd per pt request. Aware of pending d/c

## 2024-11-15 NOTE — ED TRIAGE NOTES
Amb to er with spouse with c/o vomiting with right sided abd pain since last night with total body rash since 0200 this am. Took benadryl and zofran last night with emesis shortly after

## 2024-11-19 ENCOUNTER — OCCUPATIONAL MEDICINE (OUTPATIENT)
Dept: URGENT CARE | Facility: CLINIC | Age: 34
End: 2024-11-19
Payer: COMMERCIAL

## 2024-11-19 VITALS
OXYGEN SATURATION: 98 % | RESPIRATION RATE: 16 BRPM | BODY MASS INDEX: 22.9 KG/M2 | DIASTOLIC BLOOD PRESSURE: 72 MMHG | TEMPERATURE: 96.9 F | HEIGHT: 70 IN | WEIGHT: 160 LBS | SYSTOLIC BLOOD PRESSURE: 124 MMHG | HEART RATE: 80 BPM

## 2024-11-19 DIAGNOSIS — S46.911A STRAIN OF RIGHT SHOULDER, INITIAL ENCOUNTER: ICD-10-CM

## 2024-11-19 DIAGNOSIS — Z02.1 PRE-EMPLOYMENT DRUG SCREENING: ICD-10-CM

## 2024-11-19 LAB
AMP AMPHETAMINE: NORMAL
BAR BARBITURATES: NORMAL
BREATH ALCOHOL COMMENT: NORMAL
BZO BENZODIAZEPINES: NORMAL
COC COCAINE: NORMAL
INT CON NEG: NEGATIVE
INT CON POS: POSITIVE
MDMA ECSTASY: NORMAL
MET METHAMPHETAMINES: NORMAL
MTD METHADONE: NORMAL
OPI OPIATES: NORMAL
OXY OXYCODONE: NORMAL
PCP PHENCYCLIDINE: NORMAL
POC BREATHALIZER: 0 PERCENT (ref 0–0.01)
POC URINE DRUG SCREEN OCDRS: NEGATIVE
THC: NORMAL

## 2024-11-19 PROCEDURE — 82075 ASSAY OF BREATH ETHANOL: CPT | Performed by: PHYSICIAN ASSISTANT

## 2024-11-19 PROCEDURE — 80305 DRUG TEST PRSMV DIR OPT OBS: CPT | Performed by: PHYSICIAN ASSISTANT

## 2024-11-19 PROCEDURE — 99213 OFFICE O/P EST LOW 20 MIN: CPT | Performed by: PHYSICIAN ASSISTANT

## 2024-11-19 RX ORDER — TIRZEPATIDE 7.5 MG/.5ML
INJECTION, SOLUTION SUBCUTANEOUS
COMMUNITY
Start: 2024-10-18

## 2024-11-19 ASSESSMENT — FIBROSIS 4 INDEX: FIB4 SCORE: 0.51

## 2024-11-19 NOTE — LETTER
PHYSICIAN’S AND CHIROPRACTIC PHYSICIAN'S   PROGRESS REPORT   CERTIFICATION OF DISABILITY Claim Number:     Social Security Number:    Patient’s Name: Allison Yanes Date of Injury: 8/29/2024   Employer: [employer] Name of O (if applicable):      Patient’s Job Description/Occupation: RN       Previous Injuries/Diseases/Surgeries Contributing to the Condition:  none      Diagnosis: (S46.911A) Strain of right shoulder, initial encounter  (Z02.1) Pre-employment drug screening      Related to the Industrial Injury? Yes     Explain: Transferring patient at hospital      Objective Medical Findings: Patient with no bony tenderness throughout the right shoulder.  Patient has full strength and range of motion of the right upper extremity.  No swelling or step-off deformity to the shoulder.  Negative empty can test.  No pain to the shoulder against resistance with anterior, lateral and posterior movement of the right upper extremity.  No bony tenderness to palpation through the clavicle or shoulder.         None - Discharged                         Stable  No                 Ratable  No     X   Generally Improved                         Condition Worsened                  Condition Same  May Have Suffered a Permanent Disability No     Treatment Plan:    Recommend patient continue to monitor symptoms and use ice and Tylenol as needed per 's instructions.  Follow-up with any new or worsening symptoms.         No Change in Therapy                  PT/OT Prescribed                      Medication May be Used While Working        Case Management                          PT/OT Discontinued    Consultation    Further Diagnostic Studies:    Prescription(s)               X  Released to FULL DUTY /No Restrictions on (Date):       Certified TOTALLY TEMPORARILY DISABLED (Indicate Dates) From:   To:      Released to RESTRICTED/Modified Duty on (Date): From:   To:    Restrictions Are:         No Sitting    No  Standing    No Pulling Other:         No Bending at Waist     No Stooping     No Lifting        No Carrying     No Walking Lifting Restricted to (lbs.):          No Pushing        No Climbing     No Reaching Above Shoulders       Date of Next Visit:    Date of this Exam: 11/19/2024 Physician/Chiropractic Physician Name: John Purcell P.A.-C. Physician/Chiropractic Physician Signature:  Kwan Motley DO MPH     [place]:  [facility]  [facility], [address] [place], [place] [address] - Telephone [phone] [place]:  [address] [place] [address], [address] [place], [place] [address] - Telephone [phone]    [email or web address]  D-39 (Rev. 10/24)

## 2024-11-19 NOTE — PROGRESS NOTES
"Subjective     Allison Yanes is a 34 y.o. 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  She states she has been able to work at full duty since then.  Does have some discomfort on more laborious days but overall the pain has been improving.  She has been taking Tylenol intermittently as needed.  No pain elicited otherwise.  No history of shoulder pain or injury.  Patient states symptoms have been improving but she just wanted to be checked out to make sure everything was okay.    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   Constitutional:       General: She is not in acute distress.     Appearance: Normal appearance. She is well-developed. She is not ill-appearing or toxic-appearing.   HENT:      Head: Normocephalic and atraumatic.      Right Ear: Hearing normal.      Left Ear: Hearing normal.   Cardiovascular:      Rate and Rhythm: Normal rate.   Pulmonary:      Effort: Pulmonary effort is normal.   Musculoskeletal:      Comments: As described below   Skin:     General: Skin is warm and dry.   Neurological:      Mental Status: She is alert.      Coordination: Coordination normal.   Psychiatric:         Mood and Affect: Mood normal. "       Patient with no bony tenderness throughout the right shoulder.  Patient has full strength and range of motion of the right upper extremity.  No swelling or step-off deformity to the shoulder.  Negative empty can test.  No pain to the shoulder against resistance with anterior, lateral and posterior movement of the right upper extremity.  No bony tenderness to palpation through the clavicle or shoulder.          Assessment & Plan        Assessment & Plan  Strain of right shoulder, initial encounter         Recommend patient continue to monitor symptoms and use ice and Tylenol as needed per 's instructions.  Follow-up with any new or worsening symptoms.  Patient can continue working at full duty.  No pain elicited on exam today.  No indication for any need of imaging.  Most likely strain that has healed over the last three months.  Recommend continue with gentle stretching and can ease back into normal exercise.       Please note that this dictation was created using voice recognition software. I have made every reasonable attempt to correct obvious errors, but I expect that there may be errors of grammar and possibly content that I did not discover before finalizing the note.

## 2024-11-19 NOTE — LETTER
"    EMPLOYEE’S CLAIM FOR COMPENSATION/ REPORT OF INITIAL TREATMENT  FORM C-4  PLEASE TYPE OR PRINT    EMPLOYEE’S CLAIM - PROVIDE ALL INFORMATION REQUESTED   First Name                    PRECIOUS Cooper                  Last Name  Joaquín Birthdate                    1990                Sex  Female Claim Number (Insurer’s Use Only)     Home Address  [address] Age  34 y.o. Height  1.778 m (5' 10\") Weight  72.6 kg (160 lb) Social Security Number     [facility] Zip  [address] Telephone  [phone] (home)    Mailing Address  [address]  [address] Primary Language Spoken  English    INSURER   THIRD-PARTY   Esis   Employee's Occupation (Job Title) When Injury or Occupational Disease Occurred  RN    Employer's Name/Company Name  [employer]  Telephone  [phone]    Office Mail Address (Number and Street)  [address] [place] [address]   Date of Injury (if applicable) 8/29/2024               Hours Injury (if applicable)  4:15 PM Date Employer Notified  8/29/2024 Last Day of Work after Injury or Occupational Disease  11/13/2024 Supervisor to Whom Injury Reported  Stephanie   Address or Location of Accident (if applicable)  Work [1]   What were you doing at the time of accident? (if applicable)  Transferring a patient    How did this injury or occupational disease occur? (Be specific and answer in detail. Use additional sheet if necessary)  Transferring a patient from a hospitl bed to a gurney, pushing away from myself   If you believe that you have an occupational disease, when did you first have knowledge of the disability and its relationship to your employment?   Witnesses to the Accident (if applicable)  Abel Faith      Nature of Injury or Occupational Disease  Inflammation  Part(s) of Body Injured or Affected  Shoulder (R) N/A " N/A    I CERTIFY THAT THE ABOVE IS TRUE AND CORRECT TO T HE BEST OF MY KNOWLEDGE AND THAT I HAVE PROVIDED THIS INFORMATION IN ORDER TO OBTAIN THE BENEFITS OF [place], OR CHAPTER 617 OF [place]RS).  I HEREBY AUTHORIZE ANY PHYSICIAN, CHIROPRACTOR, SURGEON, PRACTITIONER OR ANY OTHER PERSON, ANY HOSPITAL, INCLUDING [facility] OR [facility], ANY  MEDICAL SERVICE ORGANIZATION, ANY INSURANCE COMPANY, OR OTHER INSTITUTION OR ORGANIZATION TO RELEASE TO EACH OTHER, ANY MEDICAL OR OTHER INFORMATION, INCLUDING BENEFITS PAID OR PAYABLE, PERTINENT TO THIS INJURY OR DISEASE, EXCEPT INFORMATION RELATIVE TO DIAGNOSIS, TREATMENT AND/OR COUNSELING FOR AIDS, PSYCHOLOGICAL CONDITIONS, ALCOHOL OR CONTROLLED SUBSTANCES, FOR WHICH I MUST GIVE SPECIFIC AUTHORIZATION.  A PHOTOSTAT OF THIS AUTHORIZATION SHALL BE VALID AS THE ORIGINAL.     Date 11/19/2024   [facility]  Employee’s Original or  *Electronic Signature   THIS REPORT MUST BE COMPLETED AND MAILED WITHIN 3 WORKING DAYS OF TREATMENT   Place  [facility]    Name of [facility]   Date 11/19/2024 Diagnosis and Description of Injury or Occupational Disease  (S46.911A) Strain of right shoulder, initial encounter  (Z02.1) Pre-employment drug screening  Diagnoses of Strain of right shoulder, initial encounter and Pre-employment drug screening were pertinent to this visit. Is there evidence that the injured employee was under the influence of alcohol and/or another controlled substance at the time of accident?  []No  [] Yes (if yes, please explain)   Hour 11:29 AM  No   Treatment: Recommend patient continue to monitor symptoms and use ice and Tylenol as needed per 's instructions.  Follow-up with any new or worsening symptoms.    Have you advised the patient to remain off work five days or more?   [] Yes Indicate dates: From   To    [] No       If no, is the injured employee capable of: [] full duty [] modified duty                     If modified duty, specify any limitations / restrictions:                                                                                                                                                                                                                                                                                                                                                                                                                  X-Ray Findings:      From information given by the employee, together with medical evidence, can you directly connect this injury or occupational disease as job incurred?  []Yes   [] No Yes    Is additional medical care by a physician indicated? []Yes [] No  Yes    Do you know of any previous injury or disease contributing to this condition or occupational disease? []Yes [] No (Explain if yes)                          No   Date  11/19/2024 Print Health Care Provider’s Name  Fred Purcell P.A.-C. I certify that the employer’s copy of  this form was delivered to the employer on:   Address  [address],   [address] INSURER'S USE ONLY                       [facility]  [address] Provider’s Tax ID Number  [id number]   Telephone  Dept: [phone]    Health Care Provider’s Original or Electronic Signature  e-FRED Richardson P.A.-C. Degree (MD,DO, DC,PA-C,APRN)  PAJOSÉ  Choose (if applicable)      ORIGINAL - TREATING HEALTHCARE PROVIDER PAGE 2 - INSURER/TPA PAGE 3 - EMPLOYER PAGE 4 - EMPLOYEE             Form C-4 (rev.08/23)

## 2025-04-11 ENCOUNTER — EH NON-PROVIDER (OUTPATIENT)
Dept: OCCUPATIONAL MEDICINE | Facility: CLINIC | Age: 35
End: 2025-04-11

## 2025-04-11 DIAGNOSIS — Z02.89 ENCOUNTER FOR OCCUPATIONAL HEALTH EXAMINATION INVOLVING RESPIRATOR: ICD-10-CM

## 2025-04-11 PROCEDURE — 94375 RESPIRATORY FLOW VOLUME LOOP: CPT | Performed by: NURSE PRACTITIONER

## 2025-05-13 ENCOUNTER — PHARMACY VISIT (OUTPATIENT)
Dept: PHARMACY | Facility: MEDICAL CENTER | Age: 35
End: 2025-05-13
Payer: COMMERCIAL

## 2025-05-13 PROCEDURE — RXOTC WILLOW AMBULATORY OTC CHARGE: Performed by: PHARMACIST

## 2025-05-21 ENCOUNTER — OCCUPATIONAL MEDICINE (OUTPATIENT)
Dept: OCCUPATIONAL MEDICINE | Facility: CLINIC | Age: 35
End: 2025-05-21
Payer: COMMERCIAL

## 2025-05-21 ENCOUNTER — HOSPITAL ENCOUNTER (OUTPATIENT)
Facility: MEDICAL CENTER | Age: 35
End: 2025-05-21
Attending: PREVENTIVE MEDICINE
Payer: COMMERCIAL

## 2025-05-21 ENCOUNTER — APPOINTMENT (OUTPATIENT)
Dept: OCCUPATIONAL MEDICINE | Facility: CLINIC | Age: 35
End: 2025-05-21
Payer: COMMERCIAL

## 2025-05-21 VITALS
HEART RATE: 77 BPM | BODY MASS INDEX: 23.91 KG/M2 | DIASTOLIC BLOOD PRESSURE: 84 MMHG | SYSTOLIC BLOOD PRESSURE: 126 MMHG | OXYGEN SATURATION: 98 % | TEMPERATURE: 98.1 F | RESPIRATION RATE: 16 BRPM | WEIGHT: 167 LBS | HEIGHT: 70 IN

## 2025-05-21 DIAGNOSIS — Z77.21 EXPOSURE TO BLOOD OR BODY FLUID: Primary | ICD-10-CM

## 2025-05-21 LAB
ALBUMIN SERPL BCP-MCNC: 4.1 G/DL (ref 3.2–4.9)
ALBUMIN/GLOB SERPL: 1.4 G/DL
ALP SERPL-CCNC: 74 U/L (ref 30–99)
ALT SERPL-CCNC: 29 U/L (ref 2–50)
AMP AMPHETAMINE: NORMAL
ANION GAP SERPL CALC-SCNC: 10 MMOL/L (ref 7–16)
AST SERPL-CCNC: 39 U/L (ref 12–45)
BAR BARBITURATES: NORMAL
BILIRUB SERPL-MCNC: 0.5 MG/DL (ref 0.1–1.5)
BREATH ALCOHOL COMMENT: NORMAL
BUN SERPL-MCNC: 14 MG/DL (ref 8–22)
BZO BENZODIAZEPINES: NORMAL
CALCIUM ALBUM COR SERPL-MCNC: 8.7 MG/DL (ref 8.5–10.5)
CALCIUM SERPL-MCNC: 8.8 MG/DL (ref 8.5–10.5)
CHLORIDE SERPL-SCNC: 106 MMOL/L (ref 96–112)
CO2 SERPL-SCNC: 22 MMOL/L (ref 20–33)
COC COCAINE: NORMAL
CREAT SERPL-MCNC: 0.71 MG/DL (ref 0.5–1.4)
ERYTHROCYTE [DISTWIDTH] IN BLOOD BY AUTOMATED COUNT: 51.4 FL (ref 35.9–50)
GFR SERPLBLD CREATININE-BSD FMLA CKD-EPI: 115 ML/MIN/1.73 M 2
GLOBULIN SER CALC-MCNC: 2.9 G/DL (ref 1.9–3.5)
GLUCOSE SERPL-MCNC: 83 MG/DL (ref 65–99)
HBV CORE AB SERPL QL IA: NONREACTIVE
HBV SURFACE AB SERPL IA-ACNC: 115 MIU/ML (ref 0–10)
HBV SURFACE AG SER QL: ABNORMAL
HCT VFR BLD AUTO: 42.4 % (ref 37–47)
HCV AB SER QL: ABNORMAL
HGB BLD-MCNC: 13.6 G/DL (ref 12–16)
HIV 1+2 AB+HIV1 P24 AG SERPL QL IA: ABNORMAL
INT CON NEG: NORMAL
INT CON POS: NORMAL
MCH RBC QN AUTO: 31.6 PG (ref 27–33)
MCHC RBC AUTO-ENTMCNC: 32.1 G/DL (ref 32.2–35.5)
MCV RBC AUTO: 98.4 FL (ref 81.4–97.8)
MDMA ECSTASY: NORMAL
MET METHAMPHETAMINES: NORMAL
MTD METHADONE: NORMAL
OPI OPIATES: NORMAL
OXY OXYCODONE: NORMAL
PCP PHENCYCLIDINE: NORMAL
PLATELET # BLD AUTO: 269 K/UL (ref 164–446)
PMV BLD AUTO: 10.1 FL (ref 9–12.9)
POC BREATHALIZER: 0 PERCENT (ref 0–0.01)
POC URINE DRUG SCREEN OCDRS: NORMAL
POTASSIUM SERPL-SCNC: 4.2 MMOL/L (ref 3.6–5.5)
PROT SERPL-MCNC: 7 G/DL (ref 6–8.2)
RBC # BLD AUTO: 4.31 M/UL (ref 4.2–5.4)
SODIUM SERPL-SCNC: 138 MMOL/L (ref 135–145)
THC: NORMAL
WBC # BLD AUTO: 6.2 K/UL (ref 4.8–10.8)

## 2025-05-21 PROCEDURE — 87340 HEPATITIS B SURFACE AG IA: CPT

## 2025-05-21 PROCEDURE — 80305 DRUG TEST PRSMV DIR OPT OBS: CPT | Performed by: PREVENTIVE MEDICINE

## 2025-05-21 PROCEDURE — 82075 ASSAY OF BREATH ETHANOL: CPT | Performed by: PREVENTIVE MEDICINE

## 2025-05-21 PROCEDURE — 80053 COMPREHEN METABOLIC PANEL: CPT

## 2025-05-21 PROCEDURE — 99202 OFFICE O/P NEW SF 15 MIN: CPT | Performed by: PREVENTIVE MEDICINE

## 2025-05-21 PROCEDURE — 86706 HEP B SURFACE ANTIBODY: CPT

## 2025-05-21 PROCEDURE — 85027 COMPLETE CBC AUTOMATED: CPT

## 2025-05-21 PROCEDURE — 87389 HIV-1 AG W/HIV-1&-2 AB AG IA: CPT

## 2025-05-21 PROCEDURE — 86803 HEPATITIS C AB TEST: CPT

## 2025-05-21 PROCEDURE — 86704 HEP B CORE ANTIBODY TOTAL: CPT

## 2025-05-21 ASSESSMENT — FIBROSIS 4 INDEX: FIB4 SCORE: 0.67

## 2025-05-21 NOTE — LETTER
PHYSICIAN’S AND CHIROPRACTIC PHYSICIAN'S   PROGRESS REPORT   CERTIFICATION OF DISABILITY Claim Number:     Social Security Number:    Patient’s Name: Allison Yanes Date of Injury: 5/21/2025   Employer: [employer] Name of MCO (if applicable):      Patient’s Job Description/Occupation: RN       Previous Injuries/Diseases/Surgeries Contributing to the Condition:         Diagnosis: (Z77.21) Exposure to blood or body fluid  (primary encounter diagnosis)      Related to the Industrial Injury? Yes     Explain:        Objective Medical Findings: Constitutional: Patient is in no acute distress. Appears well-developed and well-nourished.   HENT: Normocephalic and atraumatic. EOM are normal. No scleral icterus.   Cardiovascular: Normal rate.    Pulmonary/Chest: Effort normal. No respiratory distress.   Neurological: Patient is alert and oriented to person, place, and time.   Skin: Skin is warm and dry.   Psychiatric: Normal mood and affect. Behavior is normal.              None - Discharged                         Stable  No                 Ratable  No        Generally Improved                         Condition Worsened               X   Condition Same  May Have Suffered a Permanent Disability No     Treatment Plan:    Source labs pending  Baseline labs drawn  Discussed risk and benefits of HIV prophylaxis in this scenario, will hold off on HIV prophylaxis until source labs resulted  If source positive for HIV will prescribe HIV prophylaxis and proceed with CDC monitoring protocol  If hepatitis C positive will proceed with CDC monitoring protocol  Patient has received hep B immunization series  Tetanus is up to date  Follow-up 2 days for lab results             No Change in Therapy                  PT/OT Prescribed                      Medication May be Used While Working        Case Management                          PT/OT Discontinued    Consultation    Further Diagnostic Studies:    Prescription(s)                X  Released to FULL DUTY /No Restrictions on (Date):  5/21/2025    Certified TOTALLY TEMPORARILY DISABLED (Indicate Dates) From:   To:      Released to RESTRICTED/Modified Duty on (Date): From:   To:    Restrictions Are:         No Sitting    No Standing    No Pulling Other:         No Bending at Waist     No Stooping     No Lifting        No Carrying     No Walking Lifting Restricted to (lbs.):          No Pushing        No Climbing     No Reaching Above Shoulders       Date of Next Visit:  5/23/2025 at 8:45 am Date of this Exam: 5/21/2025 Physician/Chiropractic Physician Name: Kwan Motley D.O. Physician/Chiropractic Physician Signature:  Kwan Motley DO [facility]:  [address] [place] [address], [address] [place], [place] [address] - Telephone [phone] [place]:  [address] [place] [address], [address] [place], [place] [address] - Telephone [phone]    [email or web address]  D-39 (Rev. 10/24)

## 2025-05-21 NOTE — PROGRESS NOTES
"Subjective:     Allison Yanes is a 35 y.o. female who presents for Other (New  DOI: 05/21/2025 - Needle stick - Exam Room 17/)    MICHAEL: Accidental needlestick after administering IV.    5/21/2025: Patient is currently asymptomatic.  Only has minimal discomfort at the site of the injury in the left hand.  She states the source labs were already drawn.    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       Objective:     /84   Pulse 77   Temp 36.7 °C (98.1 °F) (Temporal)   Resp 16   Ht 1.778 m (5' 10\")   Wt 75.8 kg (167 lb)   SpO2 98%   BMI 23.96 kg/m²       Constitutional: Patient is in no acute distress. Appears well-developed and well-nourished.   HENT: Normocephalic and atraumatic. EOM are normal. No scleral icterus.   Cardiovascular: Normal rate.    Pulmonary/Chest: Effort normal. No respiratory distress.   Neurological: Patient is alert and oriented to person, place, and time.   Skin: Skin is warm and dry.   Psychiatric: Normal mood and affect. Behavior is normal.         Assessment/Plan:     1. Exposure to blood or body fluid  - POCT Breath Alcohol Test  - POCT 11 Panel Urine Drug Screen  - EXPOSED PERSON-SOURCE PT POSITIVE OR UNK (BLOOD & BODY FLUID EXPOSURE)      Source labs pending  Baseline labs drawn  Discussed risk and benefits of HIV prophylaxis in this scenario, will hold off on HIV prophylaxis until source labs resulted  If source positive for HIV will prescribe HIV prophylaxis and proceed with CDC monitoring protocol  If hepatitis C positive will proceed with CDC monitoring protocol  Patient has received hep B immunization series  Tetanus is up to date  Follow-up 2 days for lab results        Work Status: Full Duty - see D-39 or other state/federal worker's compensation forms for specific restrictions if applicable  Follow up 2 days    Differential diagnosis, natural history, supportive care, and indications for immediate follow-up " discussed.

## 2025-05-21 NOTE — LETTER
"  EMPLOYEE’S CLAIM FOR COMPENSATION/ REPORT OF INITIAL TREATMENT  FORM C-4  PLEASE TYPE OR PRINT    EMPLOYEE’S CLAIM - PROVIDE ALL INFORMATION REQUESTED   First Name                    PRECIOUS Cooper                  Last Name  Joaquín Birthdate                    1990                Sex  [x]Female Claim Number (Insurer’s Use Only)     Mailing Address  [address] [place] [address] Age  35 y.o. Height  1.778 m (5' 10\") Weight  75.8 kg (167 lb) Social Security Number     [facility] Zip  [address] Telephone  [phone] (home)    Email  [email or web address]    Primary Language Spoken  English    INSURER  [facility] THIRD-PARTY   Esis   Employee's Occupation (Job Title) When Injury or Occupational Disease Occurred  RN    Employer's Name/Company Name  [employer]  Telephone  [phone]    Office Mail Address (Number and Street)  [address] [place] [address]     Date of Injury (if applicable) 5/21/2025               Hours Injury (if applicable)  8:50 AM am    pm Date Employer Notified  5/21/2025 Last Day of Work after Injury or Occupational Disease  5/21/2025 Supervisor to Whom Injury Reported  Sera Harmon   Address or Location of Accident (if applicable)  Work [1]   What were you doing at the time of accident? (if applicable)  Preop IV Start    How did this injury or occupational disease occur? (Be specific and answer in detail. Use additional sheet if necessary)  After starting an IV, I was cleaning up and the used IV needle poked my left pinky finger through my glove.   If you believe that you have an occupational disease, when did you first have knowledge of the disability and its relationship to your employment?  N/A Witnesses to the Accident (if applicable)  N/A      Nature of Injury or Occupational Disease  Puncture  Part(s) of Body Injured or Affected  Finger (L) N/A N/A    I CERTIFY THAT THE " ABOVE IS TRUE AND CORRECT TO T HE BEST OF MY KNOWLEDGE AND THAT I HAVE PROVIDED THIS INFORMATION IN ORDER TO OBTAIN THE BENEFITS OF [place] AND [place], [place], OR CHAPTER 617 OF [place]).  I HEREBY AUTHORIZE ANY PHYSICIAN, CHIROPRACTOR, SURGEON, PRACTITIONER OR ANY OTHER PERSON, ANY HOSPITAL, INCLUDING [facility] OR [facility], ANY  MEDICAL SERVICE ORGANIZATION, ANY INSURANCE COMPANY, OR OTHER INSTITUTION OR ORGANIZATION TO RELEASE TO EACH OTHER, ANY MEDICAL OR OTHER INFORMATION, INCLUDING BENEFITS PAID OR PAYABLE, PERTINENT TO THIS INJURY OR DISEASE, EXCEPT INFORMATION RELATIVE TO DIAGNOSIS, TREATMENT AND/OR COUNSELING FOR AIDS, PSYCHOLOGICAL CONDITIONS, ALCOHOL OR CONTROLLED SUBSTANCES, FOR WHICH I MUST GIVE SPECIFIC AUTHORIZATION.  A PHOTOSTAT OF THIS AUTHORIZATION SHALL BE VALID AS THE ORIGINAL.     Date 5/21/2025   Place [facility] Employee’s Original or  *Electronic Signature   THIS REPORT MUST BE COMPLETED AND MAILED WITHIN 3 WORKING DAYS OF TREATMENT   Place  [facility] [place] [facility]    Name of [facility]   Date 5/21/2025 Diagnosis and Description of Injury or Occupational Disease  (Z77.21) Exposure to blood or body fluid  (primary encounter diagnosis)  The encounter diagnosis was Exposure to blood or body fluid. Is there evidence that the injured employee was under the influence of alcohol and/or another controlled substance at the time of accident?  []No  [] Yes (if yes, please explain)   Hour 10:07 AM  No   Treatment: None    Have you advised the patient to remain off work five days or more?   No  [] Yes  If yes, indicate dates: From_ _                                                      To __ _  [] No   If no, is the injured employee capable of: [] full duty Yes   [] modified duty      If modified duty, specify any limitations / restrictions:__________________  ___ ___________________________     X-Ray Findings:       From information given by the employee, together with medical evidence, can you directly connect this injury or occupational disease as job incurred?  []Yes   [] No Yes    Is additional medical care by a physician indicated? []Yes [] No  Yes    Do you know of any previous injury or disease contributing to this condition or occupational disease? []Yes [] No (Explain if yes)                          No   Date  5/21/2025 Print Health Care Provider’s Name  Kwan Motley D.O. I certify that the employer’s copy of  this form was delivered to the employer on:   Address  [address] [place] [address],   [address] INSURER'S USE ONLY                       [facility]  [address] Provider’s Tax ID Number  [id number]   Telephone  Dept: [phone]    Health Care Provider’s Original or Electronic Signature      e-SignTAYLORKWAN D.O.    Degree (MD,DO, DC,PA-C,APRN)  DO  Choose (if applicable)      ORIGINAL - TREATING HEALTHCARE PROVIDER PAGE 2 - INSURER/TPA PAGE 3 - EMPLOYER PAGE 4 - EMPLOYEE             Form C-4 (rev.02/25)

## 2025-05-22 ENCOUNTER — TELEPHONE (OUTPATIENT)
Dept: OCCUPATIONAL MEDICINE | Facility: CLINIC | Age: 35
End: 2025-05-22
Payer: COMMERCIAL

## 2025-05-23 ENCOUNTER — OCCUPATIONAL MEDICINE (OUTPATIENT)
Dept: OCCUPATIONAL MEDICINE | Facility: CLINIC | Age: 35
End: 2025-05-23
Payer: COMMERCIAL

## 2025-05-23 VITALS
BODY MASS INDEX: 23.32 KG/M2 | RESPIRATION RATE: 14 BRPM | HEART RATE: 74 BPM | DIASTOLIC BLOOD PRESSURE: 62 MMHG | OXYGEN SATURATION: 95 % | TEMPERATURE: 97.3 F | SYSTOLIC BLOOD PRESSURE: 112 MMHG | HEIGHT: 71 IN | WEIGHT: 166.6 LBS

## 2025-05-23 DIAGNOSIS — Z77.21 EXPOSURE TO BLOOD OR BODY FLUID: Primary | ICD-10-CM

## 2025-05-23 PROCEDURE — 99212 OFFICE O/P EST SF 10 MIN: CPT | Performed by: PREVENTIVE MEDICINE

## 2025-05-23 ASSESSMENT — FIBROSIS 4 INDEX: FIB4 SCORE: 0.89

## 2025-05-23 NOTE — PROGRESS NOTES
"Subjective:     Allison Yanes is a 35 y.o. female who presents for Follow-Up ([facility] DOI 05.21.25 / (L) FELIPA)  MICHAEL: Accidental needlestick after administering IV.     5/21/2025: Patient is currently asymptomatic.  Only has minimal discomfort at the site of the injury in the left hand.  She states the source labs were already drawn.    5/23/2025: Continues to be asymptomatic.  Here for lab follow-up      ROS           Objective:     /62   Pulse 74   Temp 36.3 °C (97.3 °F)   Resp 14   Ht 1.803 m (5' 11\")   Wt 75.6 kg (166 lb 9.6 oz)   SpO2 95%   BMI 23.24 kg/m²     Constitutional: Patient is in no acute distress. Appears well-developed and well-nourished.   Cardiovascular: Normal rate.    Pulmonary/Chest: Effort normal. No respiratory distress.   Neurological: Patient is alert and oriented to person, place, and time.   Skin: Skin is warm and dry.   Psychiatric: Normal mood and affect. Behavior is normal.     Constitutional: Patient is in no acute distress. Appears well-developed and well-nourished.   HENT: Normocephalic and atraumatic. EOM are normal. No scleral icterus.   Neurological: Patient is alert and oriented to person, place, and time.   Skin: Skin is warm and dry.   Psychiatric: Normal mood and affect. Behavior is normal.         Assessment/Plan:     1. Exposure to blood or body fluid      Source status negative for HIV, Hep B and Hep C  Baseline labs within normal limits. Negative for HIV, Hep B and Hep C. Titers indicate patient immune to Hepatitis B  Given negative source no further follow up or testing recommended  Tetanus is up-to-date  Release from care  Follow up as needed    Work Status: Full Duty - see D-39 or other state/federal worker's compensation forms for specific restrictions if applicable      Differential diagnosis, natural history, supportive care, and indications for immediate follow-up discussed.        "

## 2025-05-23 NOTE — LETTER
PHYSICIAN’S AND CHIROPRACTIC PHYSICIAN'S   PROGRESS REPORT   CERTIFICATION OF DISABILITY Claim Number:     Social Security Number:    Patient’s Name: Allison Yanes Date of Injury: 5/21/2025   Employer: [employer] Name of MCO (if applicable):      Patient’s Job Description/Occupation: RN       Previous Injuries/Diseases/Surgeries Contributing to the Condition:         Diagnosis: (Z77.21) Exposure to blood or body fluid  (primary encounter diagnosis)      Related to the Industrial Injury? Yes     Explain:        Objective Medical Findings: Constitutional: Patient is in no acute distress. Appears well-developed and well-nourished.   HENT: Normocephalic and atraumatic. EOM are normal. No scleral icterus.   Neurological: Patient is alert and oriented to person, place, and time.   Skin: Skin is warm and dry.   Psychiatric: Normal mood and affect. Behavior is normal.              None - Discharged                         Stable  No                 Ratable  No     X   Generally Improved                         Condition Worsened                  Condition Same  May Have Suffered a Permanent Disability No     Treatment Plan:    Source status negative for HIV, Hep B and Hep C  Baseline labs within normal limits. Negative for HIV, Hep B and Hep C. Titers indicate patient immune to Hepatitis B  Given negative source no further follow up or testing recommended  Tetanus is up-to-date  Release from care  Follow up as needed         No Change in Therapy                  PT/OT Prescribed                      Medication May be Used While Working        Case Management                          PT/OT Discontinued    Consultation    Further Diagnostic Studies:    Prescription(s)               X  Released to FULL DUTY /No Restrictions on (Date):  5/23/2025    Certified TOTALLY TEMPORARILY DISABLED (Indicate Dates) From:   To:      Released to RESTRICTED/Modified Duty on (Date): From:   To:    Restrictions Are:         No  Sitting    No Standing    No Pulling Other:         No Bending at Waist     No Stooping     No Lifting        No Carrying     No Walking Lifting Restricted to (lbs.):          No Pushing        No Climbing     No Reaching Above Shoulders       Date of Next Visit:   Release from care Date of this Exam: 5/23/2025 Physician/Chiropractic Physician Name: Kwan Motley D.O. Physician/Chiropractic Physician Signature:  Kwan Motley DO MPH     [place]:  [address] [place] [address], [address] [place], [place] [address] - Telephone [phone] [place]:  [address] [place] [address], [address] [place], [place] [address] - Telephone [phone]    [email or web address]  D-39 (Rev. 10/24)